# Patient Record
Sex: FEMALE | Race: OTHER
[De-identification: names, ages, dates, MRNs, and addresses within clinical notes are randomized per-mention and may not be internally consistent; named-entity substitution may affect disease eponyms.]

---

## 2017-01-06 ENCOUNTER — HOSPITAL ENCOUNTER (OUTPATIENT)
Dept: HOSPITAL 53 - M SMT | Age: 27
End: 2017-01-06
Attending: OBSTETRICS & GYNECOLOGY
Payer: COMMERCIAL

## 2017-01-06 DIAGNOSIS — Z34.81: Primary | ICD-10-CM

## 2017-01-06 LAB
BASOPHILS # BLD AUTO: 0 K/MM3 (ref 0–0.2)
BASOPHILS NFR BLD AUTO: 0.2 % (ref 0–1)
EOSINOPHIL # BLD AUTO: 0 K/MM3 (ref 0–0.5)
EOSINOPHIL NFR BLD AUTO: 0.7 % (ref 0–3)
ERYTHROCYTE [DISTWIDTH] IN BLOOD BY AUTOMATED COUNT: 11.5 % (ref 11.5–14.5)
INTERNAL CH/GC CONTROL: (no result)
LARGE UNSTAINED CELL #: 0 K/MM3 (ref 0–0.4)
LARGE UNSTAINED CELL %: 0.6 % (ref 0–4)
LYMPHOCYTES # BLD AUTO: 0.9 K/MM3 (ref 1.5–6.5)
LYMPHOCYTES NFR BLD AUTO: 12.9 % (ref 24–44)
MCH RBC QN AUTO: 31.1 PG (ref 27–33)
MCHC RBC AUTO-ENTMCNC: 34.4 G/DL (ref 32–36.5)
MCV RBC AUTO: 90.2 FL (ref 80–96)
MONOCYTES # BLD AUTO: 0.2 K/MM3 (ref 0–0.8)
MONOCYTES NFR BLD AUTO: 3.5 % (ref 0–5)
NEUTROPHILS # BLD AUTO: 5.7 K/MM3 (ref 1.8–7.7)
NEUTROPHILS NFR BLD AUTO: 82.2 % (ref 36–66)
PLATELET # BLD AUTO: 289 K/MM3 (ref 150–450)
T3RU NFR SERPL: 29 % (ref 30–39)
T4 SERPL-MCNC: 16 UG/DL (ref 4.5–12)
WBC # BLD AUTO: 6.9 K/MM3 (ref 4–10)

## 2017-01-07 LAB
CONTROL LINE: (no result)
HIV 1+2 AB+HIV1 P24 AG SERPL QL IA: NEGATIVE
HIV 1+2 AB+HIV1 P24 AG SERPL QL IA: NEGATIVE

## 2017-01-09 LAB — HBSAG PRENATAL: NEGATIVE

## 2017-02-13 ENCOUNTER — HOSPITAL ENCOUNTER (OUTPATIENT)
Dept: HOSPITAL 53 - M LAB | Age: 27
End: 2017-02-13
Payer: COMMERCIAL

## 2017-02-13 DIAGNOSIS — Z13.79: Primary | ICD-10-CM

## 2017-03-20 ENCOUNTER — HOSPITAL ENCOUNTER (OUTPATIENT)
Dept: HOSPITAL 53 - M RAD | Age: 27
End: 2017-03-20
Attending: OBSTETRICS & GYNECOLOGY
Payer: COMMERCIAL

## 2017-03-20 DIAGNOSIS — Z36: Primary | ICD-10-CM

## 2017-03-20 NOTE — REP
Clinical:  Anatomical evaluation.

 

Comparison: None .

 

Findings:

Examination demonstrates a single live intrauterine pregnancy in cephalic

presentation.  Fetal motion is identified by technologist.  Placenta is noted

posterior and grade zero without evidence for placenta previa or abruption.

Amniotic fluid volume is normal.  Cervix measures 4.3 cm in length and appears

closed.  No evidence for nuchal cord.

 

Gestational age by LMP 19 weeks 1 day with MALIKA 08/13/2017 .

Gestational age by current measurements 19 weeks 1 day with MALIKA 08/13/2017 .

 

FHR equals 155 beats per minute.

BPD  4.5 cm     19 weeks 4 days

HC  16.3 cm     19 weeks 0 days

AC  12.8 cm     18 weeks 2 days

FL  3.2 cm      20 weeks 0 days

HL  2.8 cm      18 weeks 6 days

HC/AC ratio  1.28

 

Estimated fetal weight 275 grams ( 47th percentile).

 

Anatomical assessment demonstrates normal structures including

cerebellum/posterior fossa, lungs, ventricular outflow tracts, diaphragm,

stomach, cord insertion, bladder, spine, and upper extremities.

 

Bilateral hydronephrosis with the renal pelvises measuring 5 mm diameter.

Limited evaluation of the cranium, cord plexus, cavum, facial features,

four-chamber heart, three-vessel cord, and lower extremities (cannot exclude

clubfoot).

 

Impression:

Appropriate interval growth.

Bilateral hydronephrosis suggested and anatomical limitations as described above

may warrant reevaluation and follow-up.

 

 

Signed by

Jcarlos Carlisle MD 03/20/2017 06:10 P

## 2017-04-05 ENCOUNTER — HOSPITAL ENCOUNTER (OUTPATIENT)
Dept: HOSPITAL 53 - M SMT | Age: 27
End: 2017-04-05
Attending: ADVANCED PRACTICE MIDWIFE
Payer: COMMERCIAL

## 2017-04-05 DIAGNOSIS — E03.9: Primary | ICD-10-CM

## 2017-04-05 LAB — T4 FREE SERPL-MCNC: 1.11 NG/DL (ref 0.76–1.46)

## 2017-04-10 ENCOUNTER — HOSPITAL ENCOUNTER (OUTPATIENT)
Dept: HOSPITAL 53 - M RAD | Age: 27
End: 2017-04-10
Attending: ADVANCED PRACTICE MIDWIFE
Payer: COMMERCIAL

## 2017-04-10 DIAGNOSIS — Z34.82: Primary | ICD-10-CM

## 2017-04-10 DIAGNOSIS — Z3A.21: ICD-10-CM

## 2017-04-11 NOTE — REP
REASON:  Followup fetal anatomy.

 

Multiple ultrasonographic images of the gravid uterus show a single living

intrauterine gestation in the cephalic presentation.  Doppler interrogation of

the fetal heart shows a heart rate of 136 beats per minute.  The placenta is

posterior and not low lying.  The subjective amniotic fluid volume is within

normal limits.  The cervix measures approximately 3.1 cm in length and is closed.

Evaluation of the maternal adnexal spaces showed no abnormalities.

 

BPD 5.3 cm 22 weeks 0 days

 

HC 19.6 cm 21 weeks 5 days

 

AC 16.9 cm 21 weeks 6 days

 

FL 3.7 cm 21 weeks 6 days

 

The estimated fetal weight is 459 grams which is at the 39th percentile for 22

week 1 day gestational age.

 

Structures visualized as unremarkable are as follows:  Thalami, cavum septum

pellucidum, cerebellum, cisterna magna, cerebral ventricles, spine, kidneys,

urinary bladder, cord insertion, three vessel umbilical cord, stomach, four

chamber heart, right and left ventricular outflow tracts, thorax, fetal upper lip

and fetal facial profile, and upper and lower extremities.  The fetal spine was

not well visualized today, however, it was well visualized on the prior exam of

03/20/2017.

 

IMPRESSION:

 

Single living intrauterine gestation as described above with an estimated

gestational age of 21 weeks 6 days via composite criteria and estimated date of

delivery of 08/15/2017 by today's exam.  No fetal anomalies were detected.

 

 

Signed by

Chiki Finley DO 04/11/2017 04:07 P

## 2017-05-17 ENCOUNTER — HOSPITAL ENCOUNTER (OUTPATIENT)
Dept: HOSPITAL 53 - M SMT | Age: 27
End: 2017-05-17
Attending: OBSTETRICS & GYNECOLOGY
Payer: COMMERCIAL

## 2017-05-17 DIAGNOSIS — Z34.82: Primary | ICD-10-CM

## 2017-05-17 LAB
ERYTHROCYTE [DISTWIDTH] IN BLOOD BY AUTOMATED COUNT: 12.6 % (ref 11.5–14.5)
MCH RBC QN AUTO: 31.6 PG (ref 27–33)
MCHC RBC AUTO-ENTMCNC: 34.2 G/DL (ref 32–36.5)
MCV RBC AUTO: 92.3 FL (ref 80–96)
PLATELET # BLD AUTO: 263 K/MM3 (ref 150–450)
WBC # BLD AUTO: 8.3 K/MM3 (ref 4–10)

## 2017-05-26 ENCOUNTER — HOSPITAL ENCOUNTER (OUTPATIENT)
Dept: HOSPITAL 53 - M LAB | Age: 27
End: 2017-05-26
Attending: OBSTETRICS & GYNECOLOGY
Payer: COMMERCIAL

## 2017-05-26 DIAGNOSIS — Z34.82: Primary | ICD-10-CM

## 2017-06-16 ENCOUNTER — HOSPITAL ENCOUNTER (OUTPATIENT)
Dept: HOSPITAL 53 - M SMT | Age: 27
End: 2017-06-16
Attending: ADVANCED PRACTICE MIDWIFE
Payer: COMMERCIAL

## 2017-06-16 DIAGNOSIS — E03.9: Primary | ICD-10-CM

## 2017-06-16 LAB — T4 FREE SERPL-MCNC: 1.08 NG/DL (ref 0.76–1.46)

## 2017-07-13 ENCOUNTER — HOSPITAL ENCOUNTER (OUTPATIENT)
Dept: HOSPITAL 53 - M LAB REF | Age: 27
End: 2017-07-13
Attending: OBSTETRICS & GYNECOLOGY
Payer: COMMERCIAL

## 2017-07-13 DIAGNOSIS — Z34.83: Primary | ICD-10-CM

## 2017-08-04 ENCOUNTER — HOSPITAL ENCOUNTER (OUTPATIENT)
Dept: HOSPITAL 53 - M RAD | Age: 27
End: 2017-08-04
Attending: ADVANCED PRACTICE MIDWIFE
Payer: COMMERCIAL

## 2017-08-04 DIAGNOSIS — Z3A.38: ICD-10-CM

## 2017-08-04 DIAGNOSIS — O26.843: Primary | ICD-10-CM

## 2017-08-04 NOTE — REP
REASON: Assess fetal growth.

 

Multiple ultrasonographic images of the gravid uterus show a single living

intrauterine gestation in the cephalic presentation. Doppler interrogation of the

fetal heart shows a heart rate of 136 beats per minute. The placenta is posterior

fundal and not low lying. The subjective antidotic fluid volume is within normal

limits. Evaluation of the maternal adnexal spaces showed no gross abnormalities.

The calculated amniotic fluid index is 13.4 with an expected range of 7.2 to

23.0.

 

BPD 9.5 cm= 38 weeks 6 days

 

HC 33.5 cm= 38 weeks 2 days

 

AC 34.8 cm= 38 weeks 5 days

 

FL 7.5 cm= 38 weeks 2 days

 

The estimated fetal weight is 3544 grams which is at the 59th percentile for a 38

week 5 day gestational age.

 

A full fetal anatomical screen was performed on prior exams.

 

IMPRESSION:

 

Single living intrauterine gestation as described above with an estimated

gestational age of 38 weeks 3 days via composite criteria and an estimated date

of delivery of 08/15/2017 by today's exam.

 

 

Signed by

Chiki Finley DO 08/04/2017 04:58 P

## 2017-08-07 ENCOUNTER — HOSPITAL ENCOUNTER (INPATIENT)
Dept: HOSPITAL 53 - M LDO | Age: 27
LOS: 2 days | Discharge: HOME | DRG: 560 | End: 2017-08-09
Attending: OBSTETRICS & GYNECOLOGY | Admitting: OBSTETRICS & GYNECOLOGY
Payer: COMMERCIAL

## 2017-08-07 VITALS — SYSTOLIC BLOOD PRESSURE: 128 MMHG | DIASTOLIC BLOOD PRESSURE: 71 MMHG

## 2017-08-07 VITALS — SYSTOLIC BLOOD PRESSURE: 139 MMHG | DIASTOLIC BLOOD PRESSURE: 69 MMHG

## 2017-08-07 VITALS — DIASTOLIC BLOOD PRESSURE: 72 MMHG | SYSTOLIC BLOOD PRESSURE: 133 MMHG

## 2017-08-07 VITALS — SYSTOLIC BLOOD PRESSURE: 135 MMHG | DIASTOLIC BLOOD PRESSURE: 62 MMHG

## 2017-08-07 VITALS — SYSTOLIC BLOOD PRESSURE: 139 MMHG | DIASTOLIC BLOOD PRESSURE: 85 MMHG

## 2017-08-07 VITALS — SYSTOLIC BLOOD PRESSURE: 129 MMHG | DIASTOLIC BLOOD PRESSURE: 72 MMHG

## 2017-08-07 VITALS — SYSTOLIC BLOOD PRESSURE: 140 MMHG | DIASTOLIC BLOOD PRESSURE: 91 MMHG

## 2017-08-07 VITALS — SYSTOLIC BLOOD PRESSURE: 126 MMHG | DIASTOLIC BLOOD PRESSURE: 80 MMHG

## 2017-08-07 VITALS — DIASTOLIC BLOOD PRESSURE: 64 MMHG | SYSTOLIC BLOOD PRESSURE: 121 MMHG

## 2017-08-07 VITALS — SYSTOLIC BLOOD PRESSURE: 124 MMHG | DIASTOLIC BLOOD PRESSURE: 62 MMHG

## 2017-08-07 VITALS — DIASTOLIC BLOOD PRESSURE: 86 MMHG | SYSTOLIC BLOOD PRESSURE: 143 MMHG

## 2017-08-07 VITALS — DIASTOLIC BLOOD PRESSURE: 88 MMHG | SYSTOLIC BLOOD PRESSURE: 138 MMHG

## 2017-08-07 VITALS — DIASTOLIC BLOOD PRESSURE: 74 MMHG | SYSTOLIC BLOOD PRESSURE: 125 MMHG

## 2017-08-07 VITALS — SYSTOLIC BLOOD PRESSURE: 133 MMHG | DIASTOLIC BLOOD PRESSURE: 78 MMHG

## 2017-08-07 VITALS — DIASTOLIC BLOOD PRESSURE: 72 MMHG | SYSTOLIC BLOOD PRESSURE: 145 MMHG

## 2017-08-07 VITALS — SYSTOLIC BLOOD PRESSURE: 135 MMHG | DIASTOLIC BLOOD PRESSURE: 67 MMHG

## 2017-08-07 VITALS — SYSTOLIC BLOOD PRESSURE: 137 MMHG | DIASTOLIC BLOOD PRESSURE: 77 MMHG

## 2017-08-07 VITALS — SYSTOLIC BLOOD PRESSURE: 116 MMHG | DIASTOLIC BLOOD PRESSURE: 71 MMHG

## 2017-08-07 VITALS — SYSTOLIC BLOOD PRESSURE: 126 MMHG | DIASTOLIC BLOOD PRESSURE: 74 MMHG

## 2017-08-07 VITALS — DIASTOLIC BLOOD PRESSURE: 85 MMHG | SYSTOLIC BLOOD PRESSURE: 139 MMHG

## 2017-08-07 VITALS — BODY MASS INDEX: 37.26 KG/M2 | HEIGHT: 64 IN | WEIGHT: 218.26 LBS

## 2017-08-07 VITALS — DIASTOLIC BLOOD PRESSURE: 90 MMHG | SYSTOLIC BLOOD PRESSURE: 154 MMHG

## 2017-08-07 VITALS — DIASTOLIC BLOOD PRESSURE: 77 MMHG | SYSTOLIC BLOOD PRESSURE: 126 MMHG

## 2017-08-07 VITALS — DIASTOLIC BLOOD PRESSURE: 80 MMHG | SYSTOLIC BLOOD PRESSURE: 128 MMHG

## 2017-08-07 VITALS — DIASTOLIC BLOOD PRESSURE: 67 MMHG | SYSTOLIC BLOOD PRESSURE: 124 MMHG

## 2017-08-07 VITALS — SYSTOLIC BLOOD PRESSURE: 128 MMHG | DIASTOLIC BLOOD PRESSURE: 76 MMHG

## 2017-08-07 VITALS — DIASTOLIC BLOOD PRESSURE: 85 MMHG | SYSTOLIC BLOOD PRESSURE: 135 MMHG

## 2017-08-07 VITALS — DIASTOLIC BLOOD PRESSURE: 63 MMHG | SYSTOLIC BLOOD PRESSURE: 124 MMHG

## 2017-08-07 VITALS — SYSTOLIC BLOOD PRESSURE: 151 MMHG | DIASTOLIC BLOOD PRESSURE: 67 MMHG

## 2017-08-07 VITALS — DIASTOLIC BLOOD PRESSURE: 63 MMHG | SYSTOLIC BLOOD PRESSURE: 132 MMHG

## 2017-08-07 VITALS — SYSTOLIC BLOOD PRESSURE: 112 MMHG | DIASTOLIC BLOOD PRESSURE: 59 MMHG

## 2017-08-07 VITALS — DIASTOLIC BLOOD PRESSURE: 82 MMHG | SYSTOLIC BLOOD PRESSURE: 144 MMHG

## 2017-08-07 VITALS — DIASTOLIC BLOOD PRESSURE: 70 MMHG | SYSTOLIC BLOOD PRESSURE: 128 MMHG

## 2017-08-07 VITALS — DIASTOLIC BLOOD PRESSURE: 67 MMHG | SYSTOLIC BLOOD PRESSURE: 130 MMHG

## 2017-08-07 VITALS — DIASTOLIC BLOOD PRESSURE: 60 MMHG | SYSTOLIC BLOOD PRESSURE: 132 MMHG

## 2017-08-07 VITALS — SYSTOLIC BLOOD PRESSURE: 132 MMHG | DIASTOLIC BLOOD PRESSURE: 83 MMHG

## 2017-08-07 VITALS — DIASTOLIC BLOOD PRESSURE: 69 MMHG | SYSTOLIC BLOOD PRESSURE: 117 MMHG

## 2017-08-07 VITALS — DIASTOLIC BLOOD PRESSURE: 61 MMHG | SYSTOLIC BLOOD PRESSURE: 130 MMHG

## 2017-08-07 VITALS — DIASTOLIC BLOOD PRESSURE: 69 MMHG | SYSTOLIC BLOOD PRESSURE: 108 MMHG

## 2017-08-07 VITALS — DIASTOLIC BLOOD PRESSURE: 69 MMHG | SYSTOLIC BLOOD PRESSURE: 133 MMHG

## 2017-08-07 VITALS — SYSTOLIC BLOOD PRESSURE: 131 MMHG | DIASTOLIC BLOOD PRESSURE: 83 MMHG

## 2017-08-07 VITALS — DIASTOLIC BLOOD PRESSURE: 85 MMHG | SYSTOLIC BLOOD PRESSURE: 142 MMHG

## 2017-08-07 VITALS — DIASTOLIC BLOOD PRESSURE: 70 MMHG | SYSTOLIC BLOOD PRESSURE: 133 MMHG

## 2017-08-07 VITALS — SYSTOLIC BLOOD PRESSURE: 120 MMHG | DIASTOLIC BLOOD PRESSURE: 69 MMHG

## 2017-08-07 VITALS — DIASTOLIC BLOOD PRESSURE: 82 MMHG | SYSTOLIC BLOOD PRESSURE: 134 MMHG

## 2017-08-07 DIAGNOSIS — Z3A.39: ICD-10-CM

## 2017-08-07 DIAGNOSIS — E03.9: ICD-10-CM

## 2017-08-07 LAB
ALT SERPL W P-5'-P-CCNC: 25 U/L (ref 12–78)
AST SERPL-CCNC: 23 U/L (ref 15–37)
BASE EXCESS BLDCOA CALC-SCNC: -8.5 MMOL/L
BASE EXCESS BLDCOV CALC-SCNC: -8.5 MMOL/L
BILIRUB SERPL-MCNC: 0.2 MG/DL (ref 0.2–1)
CO2 BLDCOA CALC-SCNC: 22.8 MEQ/L
CO2 BLDCOV CALC-SCNC: 20.5 MEQ/L
CREAT SERPL-MCNC: 0.65 MG/DL (ref 0.55–1.02)
ERYTHROCYTE [DISTWIDTH] IN BLOOD BY AUTOMATED COUNT: 14 % (ref 11.5–14.5)
GFR SERPL CREATININE-BSD FRML MDRD: > 60 ML/MIN/{1.73_M2} (ref 60–?)
HCO3 STD BLDCOA-SCNC: 16.4 MEQ/L
HCO3 STD BLDCOA-SCNC: 21 MEQ/L
HCO3 STD BLDCOV-SCNC: 17 MEQ/L
HCO3 STD BLDCOV-SCNC: 19.1 MEQ/L
MCH RBC QN AUTO: 31.5 PG (ref 27–33)
MCHC RBC AUTO-ENTMCNC: 35.3 G/DL (ref 32–36.5)
MCV RBC AUTO: 89.3 FL (ref 80–96)
PCO2 BLDCOA: 58.3 MMHG
PCO2 BLDCOV: 46.5 MMHG
PH BLDCOA: 7.17 UNITS
PH BLDCOV: 7.23 UNITS
PLATELET # BLD AUTO: 254 K/MM3 (ref 150–450)
PO2 BLDCOA: 22.9 MMHG
PO2 BLDCOV: 34.3 MMHG
SAO2 % BLDCOA: 36.5 %
SAO2 % BLDCOV: 65.2 %
URATE SERPL-MCNC: 4.3 MG/DL (ref 2.6–6)
WBC # BLD AUTO: 8.4 K/MM3 (ref 4–10)

## 2017-08-07 PROCEDURE — 0KQM0ZZ REPAIR PERINEUM MUSCLE, OPEN APPROACH: ICD-10-PCS | Performed by: OBSTETRICS & GYNECOLOGY

## 2017-08-07 NOTE — HPE
DATE OF ADMISSION:   2017

 

Joanne is a 27-year-old female  2, para 0-0-1-0 with an estimated date of

confinement (EDC) of 2017, estimated gestational age 39-3/7 weeks

gestation, who presented to labor and delivery with complaints of leakage of

fluid.  Upon evaluation in labor and delivery, she was found to be grossly

ruptured.  At this point, a decision was made for admission.  She does have a

history of hypothyroidism, currently on medication.  Upon admission, no bleeding.

Good fetal movement.  Her prenatal record reviewed.  Other than the hypothyroid,

was essentially unremarkable.

 

PAST MEDICAL HISTORY:  Significant for hypothyroidism.

 

PAST SURGICAL HISTORY:  Denies.

 

SOCIAL HISTORY:  She is .  Denies any alcohol, drugs or cigarette

smoking.

 

REVIEW OF SYSTEMS:  Unremarkable.

 

FAMILY HISTORY:  Significant for diabetes and thyroid disease.

 

MEDICATIONS:

- prenatal vitamins

- Synthroid 100 mcg

 

ALLERGIES:  No known drug allergies.

 

PHYSICAL EXAMINATION:

Obese female in no acute distress.

HEENT:  Grossly within normal limits.

ABDOMEN :  Soft, nontender, nondistended.  Gravid.

EXTREMITIES:  No clubbing, cyanosis or edema.

Vaginal exam done by RN 1 cm dilated, fetus in vertex position with gross rupture

of membranes.  Tracing reviewed.  Category one tracing with irregular

contractions.

 

Prenatal laboratories:  Blood type is B+, rubella immune, hepatitis negative, HIV

negative, GC and chlamydia negative, 1-hour sugar testing was abnormal and 3

hours was within normal limits.  Genetic screening test was negative.  GBS

negative.

 

ASSESSMENT:

1.  Intrauterine pregnancy at 39 weeks plus gestation.

2.  Gross rupture of membranes.

3.  GBS negative.

 

PLAN:  Admit to labor and delivery.  Routine labs sent.  Induction process

discussed with the patient.

## 2017-08-08 VITALS — SYSTOLIC BLOOD PRESSURE: 100 MMHG | DIASTOLIC BLOOD PRESSURE: 77 MMHG

## 2017-08-08 VITALS — SYSTOLIC BLOOD PRESSURE: 129 MMHG | DIASTOLIC BLOOD PRESSURE: 63 MMHG

## 2017-08-08 VITALS — SYSTOLIC BLOOD PRESSURE: 133 MMHG | DIASTOLIC BLOOD PRESSURE: 67 MMHG

## 2017-08-08 VITALS — SYSTOLIC BLOOD PRESSURE: 123 MMHG | DIASTOLIC BLOOD PRESSURE: 64 MMHG

## 2017-08-08 VITALS — DIASTOLIC BLOOD PRESSURE: 68 MMHG | SYSTOLIC BLOOD PRESSURE: 117 MMHG

## 2017-08-08 VITALS — SYSTOLIC BLOOD PRESSURE: 121 MMHG | DIASTOLIC BLOOD PRESSURE: 59 MMHG

## 2017-08-08 VITALS — DIASTOLIC BLOOD PRESSURE: 66 MMHG | SYSTOLIC BLOOD PRESSURE: 124 MMHG

## 2017-08-08 VITALS — SYSTOLIC BLOOD PRESSURE: 111 MMHG | DIASTOLIC BLOOD PRESSURE: 56 MMHG

## 2017-08-08 VITALS — DIASTOLIC BLOOD PRESSURE: 58 MMHG | SYSTOLIC BLOOD PRESSURE: 115 MMHG

## 2017-08-08 VITALS — DIASTOLIC BLOOD PRESSURE: 103 MMHG | SYSTOLIC BLOOD PRESSURE: 201 MMHG

## 2017-08-08 RX ADMIN — Medication SCH TAB: at 10:08

## 2017-08-09 VITALS — DIASTOLIC BLOOD PRESSURE: 67 MMHG | SYSTOLIC BLOOD PRESSURE: 112 MMHG

## 2017-08-09 RX ADMIN — Medication SCH TAB: at 10:00

## 2018-12-12 ENCOUNTER — HOSPITAL ENCOUNTER (OUTPATIENT)
Dept: HOSPITAL 53 - M LAB REF | Age: 28
End: 2018-12-12
Attending: OBSTETRICS & GYNECOLOGY
Payer: COMMERCIAL

## 2018-12-12 ENCOUNTER — HOSPITAL ENCOUNTER (OUTPATIENT)
Dept: HOSPITAL 53 - M SMT | Age: 28
End: 2018-12-12
Attending: OBSTETRICS & GYNECOLOGY
Payer: COMMERCIAL

## 2018-12-12 DIAGNOSIS — N93.9: Primary | ICD-10-CM

## 2018-12-12 DIAGNOSIS — Z11.3: ICD-10-CM

## 2018-12-12 DIAGNOSIS — Z12.4: Primary | ICD-10-CM

## 2018-12-12 LAB
CHLAMYDIA DNA AMPLIFICATION: NEGATIVE
FT4I SERPL CALC-MCNC: 3.3 % (ref 1.3–4.8)
GC DNA AMPLIFICATION: NEGATIVE
T UPTAKE: 33 % (ref 30–39)
THYROID STIMULATING HORMONE: 3.21 UIU/ML (ref 0.36–3.74)
THYROXINE (T4): 10.1 UG/DL (ref 4.5–12)

## 2018-12-12 PROCEDURE — 87591 N.GONORRHOEAE DNA AMP PROB: CPT

## 2018-12-12 PROCEDURE — 84443 ASSAY THYROID STIM HORMONE: CPT
